# Patient Record
Sex: FEMALE | Race: WHITE | NOT HISPANIC OR LATINO | Employment: OTHER | URBAN - METROPOLITAN AREA
[De-identification: names, ages, dates, MRNs, and addresses within clinical notes are randomized per-mention and may not be internally consistent; named-entity substitution may affect disease eponyms.]

---

## 2018-08-19 ENCOUNTER — HOSPITAL ENCOUNTER (INPATIENT)
Facility: MEDICAL CENTER | Age: 68
LOS: 3 days | DRG: 375 | End: 2018-08-23
Attending: EMERGENCY MEDICINE | Admitting: HOSPITALIST
Payer: MEDICARE

## 2018-08-19 DIAGNOSIS — Z79.01 ANTICOAGULATED: ICD-10-CM

## 2018-08-19 DIAGNOSIS — K92.2 GASTROINTESTINAL HEMORRHAGE, UNSPECIFIED GASTROINTESTINAL HEMORRHAGE TYPE: ICD-10-CM

## 2018-08-19 DIAGNOSIS — Z86.711 HISTORY OF PULMONARY EMBOLISM: ICD-10-CM

## 2018-08-19 DIAGNOSIS — D64.9 LOW HEMOGLOBIN: ICD-10-CM

## 2018-08-19 PROCEDURE — 85576 BLOOD PLATELET AGGREGATION: CPT

## 2018-08-19 PROCEDURE — 36415 COLL VENOUS BLD VENIPUNCTURE: CPT

## 2018-08-19 PROCEDURE — 83690 ASSAY OF LIPASE: CPT

## 2018-08-19 PROCEDURE — 85025 COMPLETE CBC W/AUTO DIFF WBC: CPT

## 2018-08-19 PROCEDURE — 99285 EMERGENCY DEPT VISIT HI MDM: CPT

## 2018-08-19 PROCEDURE — 85384 FIBRINOGEN ACTIVITY: CPT

## 2018-08-19 PROCEDURE — 80053 COMPREHEN METABOLIC PANEL: CPT

## 2018-08-19 PROCEDURE — 85610 PROTHROMBIN TIME: CPT

## 2018-08-19 PROCEDURE — 85347 COAGULATION TIME ACTIVATED: CPT

## 2018-08-19 ASSESSMENT — LIFESTYLE VARIABLES
TOTAL SCORE: 0
TOTAL SCORE: 0
HAVE YOU EVER FELT YOU SHOULD CUT DOWN ON YOUR DRINKING: NO
EVER FELT BAD OR GUILTY ABOUT YOUR DRINKING: NO
CONSUMPTION TOTAL: INCOMPLETE
DO YOU DRINK ALCOHOL: YES
HAVE PEOPLE ANNOYED YOU BY CRITICIZING YOUR DRINKING: NO
TOTAL SCORE: 0
EVER HAD A DRINK FIRST THING IN THE MORNING TO STEADY YOUR NERVES TO GET RID OF A HANGOVER: NO

## 2018-08-19 ASSESSMENT — PAIN SCALES - GENERAL: PAINLEVEL_OUTOF10: 0

## 2018-08-20 ENCOUNTER — APPOINTMENT (OUTPATIENT)
Dept: RADIOLOGY | Facility: MEDICAL CENTER | Age: 68
DRG: 375 | End: 2018-08-20
Attending: HOSPITALIST
Payer: MEDICARE

## 2018-08-20 PROBLEM — K92.2 GIB (GASTROINTESTINAL BLEEDING): Status: ACTIVE | Noted: 2018-08-20

## 2018-08-20 PROBLEM — I26.99 PULMONARY EMBOLISM (HCC): Status: ACTIVE | Noted: 2018-08-20

## 2018-08-20 LAB
ABO GROUP BLD: NORMAL
ABO GROUP BLD: NORMAL
ALBUMIN SERPL BCP-MCNC: 3.5 G/DL (ref 3.2–4.9)
ALBUMIN/GLOB SERPL: 1.3 G/DL
ALP SERPL-CCNC: 67 U/L (ref 30–99)
ALT SERPL-CCNC: 14 U/L (ref 2–50)
ANION GAP SERPL CALC-SCNC: 8 MMOL/L (ref 0–11.9)
AST SERPL-CCNC: 15 U/L (ref 12–45)
BASOPHILS # BLD AUTO: 0.6 % (ref 0–1.8)
BASOPHILS # BLD: 0.04 K/UL (ref 0–0.12)
BILIRUB SERPL-MCNC: 0.4 MG/DL (ref 0.1–1.5)
BLD GP AB SCN SERPL QL: NORMAL
BUN SERPL-MCNC: 19 MG/DL (ref 8–22)
CALCIUM SERPL-MCNC: 9.1 MG/DL (ref 8.5–10.5)
CFT BLD TEG: 6.3 MIN (ref 5–10)
CHLORIDE SERPL-SCNC: 108 MMOL/L (ref 96–112)
CLOT ANGLE BLD TEG: 64.4 DEGREES (ref 53–72)
CLOT LYSIS 30M P MA LENFR BLD TEG: 0 % (ref 0–8)
CO2 SERPL-SCNC: 24 MMOL/L (ref 20–33)
CREAT SERPL-MCNC: 0.97 MG/DL (ref 0.5–1.4)
CT.EXTRINSIC BLD ROTEM: 1.8 MIN (ref 1–3)
EOSINOPHIL # BLD AUTO: 0.1 K/UL (ref 0–0.51)
EOSINOPHIL NFR BLD: 1.4 % (ref 0–6.9)
ERYTHROCYTE [DISTWIDTH] IN BLOOD BY AUTOMATED COUNT: 44.5 FL (ref 35.9–50)
GLOBULIN SER CALC-MCNC: 2.7 G/DL (ref 1.9–3.5)
GLUCOSE SERPL-MCNC: 107 MG/DL (ref 65–99)
HCT VFR BLD AUTO: 31.8 % (ref 37–47)
HEMOCCULT STL QL: POSITIVE
HGB BLD-MCNC: 10.2 G/DL (ref 12–16)
HGB BLD-MCNC: 10.4 G/DL (ref 12–16)
HGB BLD-MCNC: 10.6 G/DL (ref 12–16)
HGB BLD-MCNC: 10.6 G/DL (ref 12–16)
IMM GRANULOCYTES # BLD AUTO: 0.01 K/UL (ref 0–0.11)
IMM GRANULOCYTES NFR BLD AUTO: 0.1 % (ref 0–0.9)
INR PPP: 1.53 (ref 0.87–1.13)
LIPASE SERPL-CCNC: 13 U/L (ref 11–82)
LYMPHOCYTES # BLD AUTO: 2.47 K/UL (ref 1–4.8)
LYMPHOCYTES NFR BLD: 34.7 % (ref 22–41)
MCF BLD TEG: 65.9 MM (ref 50–70)
MCH RBC QN AUTO: 30.7 PG (ref 27–33)
MCHC RBC AUTO-ENTMCNC: 33.3 G/DL (ref 33.6–35)
MCV RBC AUTO: 92.2 FL (ref 81.4–97.8)
MONOCYTES # BLD AUTO: 0.48 K/UL (ref 0–0.85)
MONOCYTES NFR BLD AUTO: 6.8 % (ref 0–13.4)
NEUTROPHILS # BLD AUTO: 4.01 K/UL (ref 2–7.15)
NEUTROPHILS NFR BLD: 56.4 % (ref 44–72)
NRBC # BLD AUTO: 0 K/UL
NRBC BLD-RTO: 0 /100 WBC
PLATELET # BLD AUTO: 199 K/UL (ref 164–446)
PMV BLD AUTO: 10.3 FL (ref 9–12.9)
POTASSIUM SERPL-SCNC: 3.7 MMOL/L (ref 3.6–5.5)
PROT SERPL-MCNC: 6.2 G/DL (ref 6–8.2)
PROTHROMBIN TIME: 18.1 SEC (ref 12–14.6)
RBC # BLD AUTO: 3.45 M/UL (ref 4.2–5.4)
RH BLD: NORMAL
RH BLD: NORMAL
SODIUM SERPL-SCNC: 140 MMOL/L (ref 135–145)
TEG ALGORITHM TGALG: NORMAL
WBC # BLD AUTO: 7.1 K/UL (ref 4.8–10.8)

## 2018-08-20 PROCEDURE — 86901 BLOOD TYPING SEROLOGIC RH(D): CPT

## 2018-08-20 PROCEDURE — 99223 1ST HOSP IP/OBS HIGH 75: CPT | Mod: AI | Performed by: HOSPITALIST

## 2018-08-20 PROCEDURE — 700105 HCHG RX REV CODE 258: Performed by: HOSPITALIST

## 2018-08-20 PROCEDURE — 770020 HCHG ROOM/CARE - TELE (206)

## 2018-08-20 PROCEDURE — 78278 ACUTE GI BLOOD LOSS IMAGING: CPT

## 2018-08-20 PROCEDURE — 700111 HCHG RX REV CODE 636 W/ 250 OVERRIDE (IP): Performed by: HOSPITALIST

## 2018-08-20 PROCEDURE — 36415 COLL VENOUS BLD VENIPUNCTURE: CPT

## 2018-08-20 PROCEDURE — 86850 RBC ANTIBODY SCREEN: CPT

## 2018-08-20 PROCEDURE — 700101 HCHG RX REV CODE 250: Performed by: HOSPITALIST

## 2018-08-20 PROCEDURE — 82272 OCCULT BLD FECES 1-3 TESTS: CPT

## 2018-08-20 PROCEDURE — 85018 HEMOGLOBIN: CPT

## 2018-08-20 PROCEDURE — C9113 INJ PANTOPRAZOLE SODIUM, VIA: HCPCS | Performed by: HOSPITALIST

## 2018-08-20 PROCEDURE — 86900 BLOOD TYPING SEROLOGIC ABO: CPT

## 2018-08-20 RX ORDER — ONDANSETRON 4 MG/1
4 TABLET, ORALLY DISINTEGRATING ORAL EVERY 4 HOURS PRN
Status: DISCONTINUED | OUTPATIENT
Start: 2018-08-20 | End: 2018-08-23 | Stop reason: HOSPADM

## 2018-08-20 RX ORDER — ONDANSETRON 2 MG/ML
4 INJECTION INTRAMUSCULAR; INTRAVENOUS EVERY 4 HOURS PRN
Status: DISCONTINUED | OUTPATIENT
Start: 2018-08-20 | End: 2018-08-23 | Stop reason: HOSPADM

## 2018-08-20 RX ORDER — PANTOPRAZOLE SODIUM 40 MG/10ML
40 INJECTION, POWDER, LYOPHILIZED, FOR SOLUTION INTRAVENOUS 2 TIMES DAILY
Status: DISCONTINUED | OUTPATIENT
Start: 2018-08-20 | End: 2018-08-22

## 2018-08-20 RX ORDER — SODIUM CHLORIDE, SODIUM LACTATE, POTASSIUM CHLORIDE, CALCIUM CHLORIDE 600; 310; 30; 20 MG/100ML; MG/100ML; MG/100ML; MG/100ML
INJECTION, SOLUTION INTRAVENOUS CONTINUOUS
Status: DISCONTINUED | OUTPATIENT
Start: 2018-08-20 | End: 2018-08-23 | Stop reason: HOSPADM

## 2018-08-20 RX ADMIN — SODIUM CHLORIDE, POTASSIUM CHLORIDE, SODIUM LACTATE AND CALCIUM CHLORIDE: 600; 310; 30; 20 INJECTION, SOLUTION INTRAVENOUS at 04:14

## 2018-08-20 RX ADMIN — FOLIC ACID 1 MG: 5 INJECTION, SOLUTION INTRAMUSCULAR; INTRAVENOUS; SUBCUTANEOUS at 05:51

## 2018-08-20 RX ADMIN — PANTOPRAZOLE SODIUM 40 MG: 40 INJECTION, POWDER, LYOPHILIZED, FOR SOLUTION INTRAVENOUS at 05:51

## 2018-08-20 RX ADMIN — PANTOPRAZOLE SODIUM 40 MG: 40 INJECTION, POWDER, LYOPHILIZED, FOR SOLUTION INTRAVENOUS at 17:56

## 2018-08-20 RX ADMIN — THIAMINE HYDROCHLORIDE 100 MG: 100 INJECTION, SOLUTION INTRAMUSCULAR; INTRAVENOUS at 05:51

## 2018-08-20 ASSESSMENT — ENCOUNTER SYMPTOMS
WHEEZING: 0
DIARRHEA: 1
VOMITING: 0
PALPITATIONS: 0
HEADACHES: 0
NAUSEA: 0
DIZZINESS: 0
TINGLING: 0
DEPRESSION: 0
BLOOD IN STOOL: 1
ABDOMINAL PAIN: 0
MYALGIAS: 0
PHOTOPHOBIA: 0
CHILLS: 0
COUGH: 0
FOCAL WEAKNESS: 0
SORE THROAT: 0
FEVER: 0
SHORTNESS OF BREATH: 0

## 2018-08-20 ASSESSMENT — COGNITIVE AND FUNCTIONAL STATUS - GENERAL
SUGGESTED CMS G CODE MODIFIER DAILY ACTIVITY: CH
DAILY ACTIVITIY SCORE: 24
MOBILITY SCORE: 24
SUGGESTED CMS G CODE MODIFIER MOBILITY: CH

## 2018-08-20 ASSESSMENT — PAIN SCALES - GENERAL
PAINLEVEL_OUTOF10: 0

## 2018-08-20 ASSESSMENT — COPD QUESTIONNAIRES
IN THE PAST 12 MONTHS DO YOU DO LESS THAN YOU USED TO BECAUSE OF YOUR BREATHING PROBLEMS: DISAGREE/UNSURE
COPD SCREENING SCORE: 2
DURING THE PAST 4 WEEKS HOW MUCH DID YOU FEEL SHORT OF BREATH: NONE/LITTLE OF THE TIME
HAVE YOU SMOKED AT LEAST 100 CIGARETTES IN YOUR ENTIRE LIFE: NO/DON'T KNOW
DO YOU EVER COUGH UP ANY MUCUS OR PHLEGM?: NO/ONLY WITH OCCASIONAL COLDS OR INFECTIONS

## 2018-08-20 ASSESSMENT — PATIENT HEALTH QUESTIONNAIRE - PHQ9
2. FEELING DOWN, DEPRESSED, IRRITABLE, OR HOPELESS: NOT AT ALL
SUM OF ALL RESPONSES TO PHQ9 QUESTIONS 1 AND 2: 0
1. LITTLE INTEREST OR PLEASURE IN DOING THINGS: NOT AT ALL

## 2018-08-20 ASSESSMENT — LIFESTYLE VARIABLES
ALCOHOL_USE: YES
ON A TYPICAL DAY WHEN YOU DRINK ALCOHOL HOW MANY DRINKS DO YOU HAVE: 2
TOTAL SCORE: 0
EVER FELT BAD OR GUILTY ABOUT YOUR DRINKING: NO
TOTAL SCORE: 0
EVER_SMOKED: NEVER
EVER HAD A DRINK FIRST THING IN THE MORNING TO STEADY YOUR NERVES TO GET RID OF A HANGOVER: NO
HAVE YOU EVER FELT YOU SHOULD CUT DOWN ON YOUR DRINKING: NO
CONSUMPTION TOTAL: POSITIVE
HAVE PEOPLE ANNOYED YOU BY CRITICIZING YOUR DRINKING: NO
AVERAGE NUMBER OF DAYS PER WEEK YOU HAVE A DRINK CONTAINING ALCOHOL: 5
TOTAL SCORE: 0
HOW MANY TIMES IN THE PAST YEAR HAVE YOU HAD 5 OR MORE DRINKS IN A DAY: 0

## 2018-08-20 NOTE — ED PROVIDER NOTES
ED Provider Note  Chief Complaint:   Rectal bleed    HPI:  Shakila Perez is a 68 y.o. female who presents as a transfer from [facility] emergency department for rectal bleeding.  On July 30, 2018 she was diagnosed with a pulmonary embolism and DVT and started on Xarelto.  This DVT was unprovoked, with uncertain cause at this time.  Her last dose of Xarelto was this morning, she is scheduled to take it twice daily, but did not take her evening dose.  Earlier this afternoon she developed increased frequency of stool and began to pass dark clots.  She then developed associated abdominal discomfort described as cramping without localized pain.  Over the course of the evening her bowel movements became more frequent, and she began to pass red blood clots.  She is concerned because her symptoms seem to be worsening and presented to [facility] emergency department.  She denies any shortness of breath, she states she felt some very mild symptoms of lightheadedness without describing near syncope.    She has not had any associated nausea or vomiting, no hematemesis.  She describes no abnormal bleeding or bruising prior to this event.  She denies any other medical problems.  She has no hematuria, no fevers, no chest pain, no shortness of breath.  She has no headaches, no neck or back pain.  She has no impaired immunity, no history of hyperglycemia.    She is not currently taking any anticoagulation or antiplatelet agents.  She is visiting from out of town, does not have any local specialists nor primary care provider.    Review of Systems:  See HPI for pertinent positives and negatives. All other systems negative.    Past Medical History:       Social History:  Social History     Social History Main Topics   • Smoking status: Not on file   • Smokeless tobacco: Not on file   • Alcohol use Not on file   • Drug use: Unknown   • Sexual activity: Not on file       Surgical History:  patient denies any surgical history    Current  "Medications:  Home Medications     Reviewed by Shaan Rueda (Pharmacy Tech) on 08/20/18 at 0010  Med List Status: Complete   Medication Last Dose Status   Rivaroxaban (XARELTO STARTER PACK) 15 & 20 MG Tablet Therapy Pack 8/19/2018 Active                Allergies:  No Known Allergies    Physical Exam:  Vital Signs: /78   Pulse 66   Temp 36.8 °C (98.3 °F)   Resp 16   Ht 1.626 m (5' 4\")   Wt 81.6 kg (180 lb)   SpO2 91%   BMI 30.90 kg/m²   Constitutional: Alert, no acute distress  HENT: Moist mucus membranes, normal posterior pharynx, no intraoral lesions  Eyes: Pupils equal and reactive, normal conjunctiva, no conjunctival pallor, no scleral icterus  Neck: Supple, normal range of motion, no stridor  Cardiovascular: Extremities are warm and well perfused, no murmur appreciated, normal cardiac auscultation  Pulmonary: No respiratory distress, normal work of breathing, no accessory muscule usage, breath sounds clear and equal bilaterally  Abdomen: Soft, non-distended, non-tender to palpation, no peritoneal signs, no palpable masses  : No stool in the rectal vault, occult guaiac test is strongly positive  Skin: Warm, dry, no rashes or lesions  Musculoskeletal: Normal range of motion in all extremities, no swelling or deformity noted  Neurologic: Alert, oriented, normal speech, normal motor function  Psychiatric: Normal and appropriate mood and affect    Medical records reviewed for continuity of care. Emergency records reviewed from [facility].  On laboratory evaluation White blood count 7.3, hemoglobin 11.2, hematocrit 33.1, platelet count 207.  INR 1.7.  No significant electrolyte abnormalities.  Patient presented to the emergency department with chief complaint of rectal bleeding, reported initially dark clots, then clots that were more red colored.  Noted to have a history of pulmonary embolism, currently on Xarelto which was initiated July 30, 2018.  Rectal exam " documented as negative for blood, no occult blood test documented.    Labs:  Labs Reviewed   CBC WITH DIFFERENTIAL - Abnormal; Notable for the following:        Result Value    RBC 3.45 (*)     Hemoglobin 10.6 (*)     Hematocrit 31.8 (*)     MCHC 33.3 (*)     All other components within normal limits    Narrative:     Indicate which anticoagulants the patient is on:->UNKNOWN  Is the patient on heparin (including low molecular weight  heparin, subcutaneous heparin, or lovenox)?->No  Do you want to extend TEG graph to LY30? (If no, graph will  terminate at MA)->Yes   COMP METABOLIC PANEL - Abnormal; Notable for the following:     Glucose 107 (*)     All other components within normal limits    Narrative:     Indicate which anticoagulants the patient is on:->UNKNOWN  Is the patient on heparin (including low molecular weight  heparin, subcutaneous heparin, or lovenox)?->No  Do you want to extend TEG graph to LY30? (If no, graph will  terminate at MA)->Yes   PROTHROMBIN TIME - Abnormal; Notable for the following:     PT 18.1 (*)     INR 1.53 (*)     All other components within normal limits    Narrative:     Indicate which anticoagulants the patient is on:->UNKNOWN  Is the patient on heparin (including low molecular weight  heparin, subcutaneous heparin, or lovenox)?->No  Do you want to extend TEG graph to LY30? (If no, graph will  terminate at MA)->Yes   ESTIMATED GFR - Abnormal; Notable for the following:     GFR If Non  57 (*)     All other components within normal limits    Narrative:     Indicate which anticoagulants the patient is on:->UNKNOWN  Is the patient on heparin (including low molecular weight  heparin, subcutaneous heparin, or lovenox)?->No  Do you want to extend TEG graph to LY30? (If no, graph will  terminate at MA)->Yes   LIPASE    Narrative:     Indicate which anticoagulants the patient is on:->UNKNOWN  Is the patient on heparin (including low molecular weight  heparin, subcutaneous  heparin, or lovenox)?->No  Do you want to extend TEG graph to LY30? (If no, graph will  terminate at MA)->Yes   COD (ADULT)   BASIC TEG    Narrative:     Indicate which anticoagulants the patient is on:->UNKNOWN  Is the patient on heparin (including low molecular weight  heparin, subcutaneous heparin, or lovenox)?->No  Do you want to extend TEG graph to LY30? (If no, graph will  terminate at MA)->Yes   ABO AND RH CONFIRMATION   OCCULT BLOOD STOOL       Radiology:  No orders to display        ED Medications Administered:  Medications - No data to display    Differential diagnosis:  Lower GI bleed, malignancy, bleeding polyp, anemia    MDM:  History and physical exam as documented above.  Patient presents with symptoms of lower GI bleed.  She was recently started on Xarelto, during the course of her evaluation for pulmonary embolism she reports getting a CT of the abdomen pelvis to evaluate for a kidney stone.  No malignancy was noted. Last colonoscopy was approximately 10 years ago.  Her last dose of Xarelto was this morning.    On laboratory evaluation CMP is within normal limits.  Lipase is within normal limits.  INR is 1.53.  Hemoglobin is 10.6, down from 11.2 at [facility].  Occult blood guaiac test is positive.    She remained well-appearing throughout her stay in the emergency department.  She did not have any further episodes of bloody stool.  On my reassessment, abdominal exam remains benign.    Plan at this time is for admission the hospitalist service.  Case discussed with Dr. Hicks who kindly agrees to consult gastroenterology in the morning, as this is the request of consulting service.    Disposition:  Admit to hospitalist in guarded condition    Final Impression:  1. Gastrointestinal hemorrhage, unspecified gastrointestinal hemorrhage type    2. Low hemoglobin    3. History of pulmonary embolism    4. Anticoagulated        Electronically signed by: Maricel Sandoval, 8/20/2018 2:13 AM

## 2018-08-20 NOTE — PROGRESS NOTES
Just admitted this am for GIB, holding anticoagulation, GI consulted, monitor heart rate and vitals for decompensation.

## 2018-08-20 NOTE — ED NOTES
Blood drawn and sent to lab. Pt ambulated to bathroom with stand-by assist, gait steady to provide stool sample.

## 2018-08-20 NOTE — CARE PLAN
Problem: Communication  Goal: The ability to communicate needs accurately and effectively will improve  Outcome: PROGRESSING AS EXPECTED    Intervention: [place] patient and significant other/support system to call light to alert staff of needs   08/20/18 0434   OTHER   Oriented to: All of the Following : Location of Bathroom, Visiting Policy, Unit Routine, Call Light and Bedside Controls, Bedside Rail Policy, Smoking Policy, Rights and Responsibilities, Bedside Report, and Patient Education Notebook         Problem: Knowledge Deficit  Goal: Knowledge of disease process/condition, treatment plan, diagnostic tests, and medications will improve  Outcome: PROGRESSING AS EXPECTED  Patient educated about disease process and plan of care for the shift. Patient expressed understanding. All questions answered at this time.

## 2018-08-20 NOTE — ED NOTES
Blood drawn and sent to lab. Pts bed adjusted and lights dimmed for comfort. Call light within reach.

## 2018-08-20 NOTE — PROGRESS NOTES
Bedside report received. Patient up to floor. Monitor room called, patient in sinus rhythm at a rate of 68. Patient encouraged to call before getting up, call light within reach. Bed locked and in lowest position.

## 2018-08-20 NOTE — ED TRIAGE NOTES
"Chief Complaint   Patient presents with   • Rectal Bleeding     Pt reports loose dark clots in stool that started at 1100, but states blood is now bright red.      /78   Pulse 67   Temp 36.8 °C (98.3 °F)   Resp 18   Ht 1.626 m (5' 4\")   Wt 81.6 kg (180 lb)   SpO2 92%   BMI 30.90 kg/m²     Pt brought in by EMS, transfer from [facility], pt presents with rectal bleeding that started this am. Recent DVT and PE diagnosis, started on Xarelto. pt states \"rumbling stomach\" other than that denies dizziness, SOB. Placed in room GR 24. Complaints and vitals as above. Pt on monitors, all alarms audible. Chart flagged for ERP to see.  "

## 2018-08-20 NOTE — PROGRESS NOTES
Two RN skin assessment completed with GUERLINE Diana.  Sacrum red and blanching.   Bilateral heels dry, red, and blanching.   Skin otherwise intact.

## 2018-08-20 NOTE — H&P
Hospital Medicine History and Physical    Date of Service  8/20/2018    Chief Complaint  Chief Complaint   Patient presents with   • Rectal Bleeding     Pt reports loose dark clots in stool that started at 1100, but states blood is now bright red.        History of Presenting Illness  68 y.o. female who presented on 8/19/2018 in transfer from outside facility for GI bleed.  The patient was initially seen at [facility] with complaints of bloody stools.  The patient's recent history is significant for diagnosis of DVT/PE on July 30 and initiation of anticoagulation therapy with Xarelto.  She states that around 11:00 this morning, she had a loose bowel movement which was dark and subsequently became associated with large clots of blood.  She had associated abdominal cramps.  She reports having a bowel movement every hour and eventually, she began to have bright red blood per rectum.  She denies any NSAID use and decided to hold her nighttime dose of Xarelto although she did take her morning dose.  She denies any alcohol abuse, she reports drinking 1-2 beers with dinner per night.  She has a history of a previous episode of tarry stools 3 months ago which resolved spontaneously.  She has not had a colonoscopy in approximately 10 years.  She denies any recent hiking or picnics but did ride the [employer] [place] yesterday.  She denies that anyone else in her family is sick with similar symptoms.  She has had no hematemesis or hemoptysis.  She was transferred to our facility for higher level of care and potential specialist consultation.        Primary Care Physician  No primary care provider on file.    Consultants  None    Code Status  Full    Review of Systems  Review of Systems   Constitutional: Negative for chills and fever.   HENT: Negative for congestion and sore throat.    Eyes: Negative for photophobia.   Respiratory: Negative for cough, shortness of breath and wheezing.    Cardiovascular: Negative for  chest pain and palpitations.   Gastrointestinal: Positive for blood in stool, diarrhea and melena. Negative for abdominal pain, nausea and vomiting.   Genitourinary: Negative for dysuria.   Musculoskeletal: Negative for myalgias.   Skin: Negative.    Neurological: Negative for dizziness, tingling, focal weakness and headaches.   Psychiatric/Behavioral: Negative for depression and suicidal ideas.        Past Medical History  PE/DVT    Surgical History  Appendectomy    Medications  No current facility-administered medications on file prior to encounter.      No current outpatient prescriptions on file prior to encounter.       Family History  Father with colonic polyps but no history of colon cancer    Social History  No tobacco, illicit drugs, patient drinks 1-2 beers with dinner    Allergies  No Known Allergies     Physical Exam  Laboratory   Hemodynamics  Temp (24hrs), Av.8 °C (98.3 °F), Min:36.8 °C (98.3 °F), Max:36.8 °C (98.3 °F)   Temperature: 36.8 °C (98.3 °F)  Pulse  Av.6  Min: 59  Max: 71 Heart Rate (Monitored): 60  Blood Pressure : 119/78, NIBP: 138/71      Respiratory      Respiration: 16, Pulse Oximetry: 91 %             Physical Exam   Constitutional: She is oriented to person, place, and time. No distress.   HENT:   Head: Normocephalic and atraumatic.   Right Ear: External ear normal.   Left Ear: External ear normal.   Eyes: EOM are normal. Right eye exhibits no discharge. Left eye exhibits no discharge.   Neck: Neck supple. No JVD present.   Cardiovascular: Normal rate, regular rhythm and normal heart sounds.    Pulmonary/Chest: Effort normal and breath sounds normal. No respiratory distress. She exhibits no tenderness.   Abdominal: Soft. Bowel sounds are normal. She exhibits no distension. There is no tenderness.   Musculoskeletal: Normal range of motion. She exhibits no edema.   Neurological: She is alert and oriented to person, place, and time. No cranial nerve deficit.   Skin: Skin is warm  and dry. She is not diaphoretic. No erythema.   Psychiatric: She has a normal mood and affect. Her behavior is normal.   Nursing note and vitals reviewed.    Capillary refill less than 3 seconds, distal pulses intact    Recent Labs      08/19/18   2344   WBC  7.1   RBC  3.45*   HEMOGLOBIN  10.6*   HEMATOCRIT  31.8*   MCV  92.2   MCH  30.7   MCHC  33.3*   RDW  44.5   PLATELETCT  199   MPV  10.3     Recent Labs      08/19/18   2344   SODIUM  140   POTASSIUM  3.7   CHLORIDE  108   CO2  24   GLUCOSE  107*   BUN  19   CREATININE  0.97   CALCIUM  9.1     Recent Labs      08/19/18   2344   ALTSGPT  14   ASTSGOT  15   ALKPHOSPHAT  67   TBILIRUBIN  0.4   LIPASE  13   GLUCOSE  107*     Recent Labs      08/19/18   2344   INR  1.53*             No results found for: TROPONINI    Imaging  No results found.   Assessment/Plan     Anticipate that patient will need greater than 2 midnights for management of the discussed medical issues.    * GIB (gastrointestinal bleeding)   Assessment & Plan    Patient's hemoglobin level at the outside hospital was 11.2, since arrival here, it has dropped to 10.6.  Fortunately, she has not had any further bowel movements in our facility.  Occult stool is pending.  I am holding her home Xarelto and I will keep her n.p.o. for bowel rest.  It is unclear at this point if this is an upper or lower GI bleed as she has had mixed symptoms including dark stools, melena, and right red blood.  She will be monitored closely on telemetry, receive IV fluids for volume expansion, and I will trend hemoglobin levels with plans to transfuse if she drops below 7.  I have ordered a nuclear medicine bleeding scan.  Pending these results, we will consider a GI consultation.  Should her hemoglobin dropped precipitously or she has exhibits evidence of active bleeding, then will plan to discuss with overnight GI consultant.        Pulmonary embolism (HCC)   Assessment & Plan    Holding home Xarelto, treatment as noted  above for GI bleed.          Prophylaxis: Sequential compression devices for DVT prophylaxis, PPI indicated, bowel protocol as needed

## 2018-08-20 NOTE — ASSESSMENT & PLAN NOTE
Patient has a colonic mass which is likely causing thromboembolic state.  Patient will require anticoagulation.  I spoke with gastroenterology who recommends to continue anticoagulation unless there is evidence of more bleeding.  Patient is hemodynamically stable and normal drop in hemoglobin and hematocrit.

## 2018-08-20 NOTE — PROGRESS NOTES
Assumed care of patient, A+Ox4 no c/o pain at this time. Lungs clear on RA. Ambulates self w/o difficulty tolerates well. Skin intact. Sinus rhythm on telemetry monitor.

## 2018-08-20 NOTE — CARE PLAN
Problem: Knowledge Deficit  Goal: Knowledge of disease process/condition, treatment plan, diagnostic tests, and medications will improve    Intervention: Assess knowledge level of disease process/condition, treatment plan, diagnostic tests, and medications  Answer questions regarding GI tests and Blood counts in relation to GI bleed.       Problem: Discharge Barriers/Planning  Goal: Patient's continuum of care needs will be met    Intervention: Assess potential discharge barriers on admission and throughout hospital stay  Monitor labs, monitor stool for s/s of bleeding, discuss requirements that must be met and tests completed before discharge.

## 2018-08-21 LAB
ANION GAP SERPL CALC-SCNC: 5 MMOL/L (ref 0–11.9)
BUN SERPL-MCNC: 13 MG/DL (ref 8–22)
CALCIUM SERPL-MCNC: 8.6 MG/DL (ref 8.5–10.5)
CHLORIDE SERPL-SCNC: 111 MMOL/L (ref 96–112)
CO2 SERPL-SCNC: 26 MMOL/L (ref 20–33)
CREAT SERPL-MCNC: 0.84 MG/DL (ref 0.5–1.4)
GLUCOSE SERPL-MCNC: 97 MG/DL (ref 65–99)
HGB BLD-MCNC: 10.1 G/DL (ref 12–16)
HGB BLD-MCNC: 10.7 G/DL (ref 12–16)
HGB BLD-MCNC: 11.5 G/DL (ref 12–16)
POTASSIUM SERPL-SCNC: 3.7 MMOL/L (ref 3.6–5.5)
SODIUM SERPL-SCNC: 142 MMOL/L (ref 135–145)

## 2018-08-21 PROCEDURE — 700105 HCHG RX REV CODE 258: Performed by: FAMILY MEDICINE

## 2018-08-21 PROCEDURE — 700101 HCHG RX REV CODE 250: Performed by: INTERNAL MEDICINE

## 2018-08-21 PROCEDURE — 700111 HCHG RX REV CODE 636 W/ 250 OVERRIDE (IP): Performed by: HOSPITALIST

## 2018-08-21 PROCEDURE — 700105 HCHG RX REV CODE 258: Performed by: HOSPITALIST

## 2018-08-21 PROCEDURE — 85018 HEMOGLOBIN: CPT

## 2018-08-21 PROCEDURE — 770020 HCHG ROOM/CARE - TELE (206)

## 2018-08-21 PROCEDURE — 80048 BASIC METABOLIC PNL TOTAL CA: CPT

## 2018-08-21 PROCEDURE — 36415 COLL VENOUS BLD VENIPUNCTURE: CPT

## 2018-08-21 PROCEDURE — C9113 INJ PANTOPRAZOLE SODIUM, VIA: HCPCS | Performed by: HOSPITALIST

## 2018-08-21 PROCEDURE — 700101 HCHG RX REV CODE 250: Performed by: HOSPITALIST

## 2018-08-21 RX ADMIN — PANTOPRAZOLE SODIUM 40 MG: 40 INJECTION, POWDER, LYOPHILIZED, FOR SOLUTION INTRAVENOUS at 05:55

## 2018-08-21 RX ADMIN — POLYETHYLENE GLYCOL 3350, SODIUM SULFATE ANHYDROUS, SODIUM BICARBONATE, SODIUM CHLORIDE, POTASSIUM CHLORIDE 4 L: 236; 22.74; 6.74; 5.86; 2.97 POWDER, FOR SOLUTION ORAL at 18:17

## 2018-08-21 RX ADMIN — SODIUM CHLORIDE, POTASSIUM CHLORIDE, SODIUM LACTATE AND CALCIUM CHLORIDE: 600; 310; 30; 20 INJECTION, SOLUTION INTRAVENOUS at 17:36

## 2018-08-21 RX ADMIN — FOLIC ACID 1 MG: 5 INJECTION, SOLUTION INTRAMUSCULAR; INTRAVENOUS; SUBCUTANEOUS at 06:01

## 2018-08-21 RX ADMIN — SODIUM CHLORIDE, POTASSIUM CHLORIDE, SODIUM LACTATE AND CALCIUM CHLORIDE: 600; 310; 30; 20 INJECTION, SOLUTION INTRAVENOUS at 04:32

## 2018-08-21 RX ADMIN — PANTOPRAZOLE SODIUM 40 MG: 40 INJECTION, POWDER, LYOPHILIZED, FOR SOLUTION INTRAVENOUS at 18:20

## 2018-08-21 RX ADMIN — THIAMINE HYDROCHLORIDE 100 MG: 100 INJECTION, SOLUTION INTRAMUSCULAR; INTRAVENOUS at 07:00

## 2018-08-21 ASSESSMENT — ENCOUNTER SYMPTOMS
FEVER: 0
HEARTBURN: 0
BLURRED VISION: 0
ORTHOPNEA: 0
WEIGHT LOSS: 0
CHILLS: 0
DOUBLE VISION: 0
PALPITATIONS: 0
BLOOD IN STOOL: 1
NAUSEA: 0

## 2018-08-21 ASSESSMENT — PAIN SCALES - GENERAL
PAINLEVEL_OUTOF10: 0

## 2018-08-21 NOTE — CARE PLAN
Problem: Communication  Goal: The ability to communicate needs accurately and effectively will improve  Outcome: PROGRESSING AS EXPECTED  Plan of care discussed with pt. Pt understands and agrees to plan of care. Pt instructed to ask questions, as needed. Pt verbalized understanding.     Problem: Safety  Goal: Will remain free from injury  Outcome: PROGRESSING AS EXPECTED  Pt remains free from falls or injuries. Pt up self and calls for assistance, as needed. Pt steady on feet. Treaded socks in place.

## 2018-08-21 NOTE — CONSULTS
DATE OF SERVICE:  08/21/2018    REASON FOR CONSULTATION:  Acute GI blood loss anemia, hematochezia,   anticoagulant use, and question of melena.    REFERRING PHYSICIAN:  Sharee Hewitt MD    Thank you very much for asking me to see this delightful 68-year-old woman   from out of town.  She is currently here in town visiting.  She has a history   of DVT and on 07/30 of this year.  She was placed on Xarelto for this.  She   was in her usual state of health and carrying out her normal activity level   until the day prior to presentation when she developed abdominal fecal urge   followed by dark tarry stool.  This increased in frequency and severity to the   point that it was becoming bright red bloody stool with bright red clots   every hour or so.  It was associated with very mild abdominal urgency and mild   cramping and borborygmi.  She has not had significant bowel movement since   admit.  She reports that she had a similar episode 3 months ago, which lasted   only 1 day and was only dark stool and then resolved.  She denies fevers,   chills or sweats.  No chest pain or shortness of breath.  No nausea or   vomiting, no hematemesis or coffee-ground emesis.  She otherwise feels well.    Her last colonoscopy was over 10 years ago at an outside hospital and she   reports was normal.  She does have a daughter who has colon polyps at a   relatively young age.    REVIEW OF SYSTEMS:  A 10-system review of systems performed by myself,   pertinent positives and negatives stated otherwise noncontributory.    PAST MEDICAL HISTORY:  DVT and PE.    PAST SURGICAL HISTORY:  Appendectomy.  Colonoscopy 10 years ago.    ADVERSE DRUG REACTIONS:  No known drug allergies.    MEDICATIONS:  Reviewed by myself.  Please see the chart for detail.  Of note,   she is on Xarelto, which is being held.    SOCIAL HISTORY:  Moderate alcohol use, but not to excess.  No tobacco use.    FAMILY HISTORY:  Colon polyps in her daughter, otherwise  negative for   gastrointestinal, hepatobiliary, or pancreatic disease.    LABORATORY DATA:  White count 7.1, hemoglobin 11.5, hematocrit 31.8, and   platelets 199.  Sodium 142, potassium 3.7, chloride 111, bicarbonate 26, BUN   13, creatinine 0.84, glucose 97, AST 15, ALT 14, alkaline phosphatase 67,   total bilirubin 0.4, INR 1.53 on admit.  Fecal occult blood positive.  Tagged   red blood cell nuclear medicine scan was negative.    PHYSICAL EXAMINATION:  VITAL SIGNS:  Temperature 97.6, pulse of 72, and blood pressure 114/55.  GENERAL:  She is in no acute distress, alert, oriented x3.  HEENT:  Sclerae nonicteric.  Mucous membranes are pink and moist.  No head and   neck adenopathy.  LUNGS:  Clear bilaterally.  HEART:  Sounds regular.  ABDOMEN:  Soft, nontender, and nondistended.  Bowel sounds are normal.    ASSESSMENT:  1.  Acute gastrointestinal bleed anemia.  2.  Melena/hematochezia.  3.  Anticoagulant use.  4.  DVT and PE.    DISCUSSION:  From her history, this sounds like it could be an upper   gastrointestinal bleed with initial dark material; however, she is ____   unstable.  She looks very well and has had no significant abdominal pain or   cramping.  I favor a lower GI bleed in the form of likely diverticulosis in   nature.  Other entities such as ischemic colitis, neoplasm, arteriovenous   malformations are also on the list.  There is no indication of portal   hypertension, so varices are less likely.    PLAN:  1.  Clear liquid diet okay, no red food.  2.  N.p.o. past midnight.  3.  Continue to hold Xarelto.  4.  Serial CBCs.  5.  Transfuse to keep hemoglobin greater than or equal to 7.  6.  Start colonoscopy prep.  7.  EGD with colonoscopy in the department with moderate sedation tomorrow.    Thank you very much for allowing me to participate in the care of this very   nice lady.  If you have any questions, please do not hesitate to call.       ____________________________________     YFN ANGELES  MD KACI ISSA / AMOS    DD:  08/21/2018 16:29:59  DT:  08/21/2018 16:56:29    D#:  [id number]  Job#:  [id number]

## 2018-08-21 NOTE — PROGRESS NOTES
Pt care assumed. Pt lying comfortably in bed. A&O x 4. No distress present; no pain. Call light, phone, and bedside table within reach. White board updated and plan of care discussed with patient. Pt up self and calls for assistance, as needed. Will continue to monitor.

## 2018-08-21 NOTE — PROGRESS NOTES
Assumed care of patient, A+Ox4 no c/o pain at this time. Ambulates self to bathroom tolerates well. Sinus rhythm on telemetry monitor. Lungs clear on RA. Skin intact.

## 2018-08-22 ENCOUNTER — PATIENT OUTREACH (OUTPATIENT)
Dept: HEALTH INFORMATION MANAGEMENT | Facility: OTHER | Age: 68
End: 2018-08-22

## 2018-08-22 ENCOUNTER — APPOINTMENT (OUTPATIENT)
Dept: RADIOLOGY | Facility: MEDICAL CENTER | Age: 68
DRG: 375 | End: 2018-08-22
Attending: INTERNAL MEDICINE
Payer: MEDICARE

## 2018-08-22 PROBLEM — K63.89 MASS OF COLON: Chronic | Status: ACTIVE | Noted: 2018-08-22

## 2018-08-22 PROBLEM — K92.2 GIB (GASTROINTESTINAL BLEEDING): Status: RESOLVED | Noted: 2018-08-20 | Resolved: 2018-08-22

## 2018-08-22 PROBLEM — K22.10 EROSIVE ESOPHAGITIS: Chronic | Status: ACTIVE | Noted: 2018-08-22

## 2018-08-22 PROBLEM — K63.89 MASS OF COLON: Chronic | Status: RESOLVED | Noted: 2018-08-22 | Resolved: 2018-08-22

## 2018-08-22 LAB — CEA SERPL-MCNC: 1.6 NG/ML (ref 0–3)

## 2018-08-22 PROCEDURE — 700117 HCHG RX CONTRAST REV CODE 255: Performed by: INTERNAL MEDICINE

## 2018-08-22 PROCEDURE — 71260 CT THORAX DX C+: CPT

## 2018-08-22 PROCEDURE — 88312 SPECIAL STAINS GROUP 1: CPT

## 2018-08-22 PROCEDURE — 700111 HCHG RX REV CODE 636 W/ 250 OVERRIDE (IP)

## 2018-08-22 PROCEDURE — 160208 HCHG ENDO MINUTES - EA ADDL 1 MIN LEVEL 4: Performed by: INTERNAL MEDICINE

## 2018-08-22 PROCEDURE — 700102 HCHG RX REV CODE 250 W/ 637 OVERRIDE(OP): Performed by: FAMILY MEDICINE

## 2018-08-22 PROCEDURE — C9113 INJ PANTOPRAZOLE SODIUM, VIA: HCPCS | Performed by: HOSPITALIST

## 2018-08-22 PROCEDURE — 700101 HCHG RX REV CODE 250: Performed by: HOSPITALIST

## 2018-08-22 PROCEDURE — 99153 MOD SED SAME PHYS/QHP EA: CPT | Performed by: INTERNAL MEDICINE

## 2018-08-22 PROCEDURE — 0DBN8ZZ EXCISION OF SIGMOID COLON, VIA NATURAL OR ARTIFICIAL OPENING ENDOSCOPIC: ICD-10-PCS | Performed by: INTERNAL MEDICINE

## 2018-08-22 PROCEDURE — 700105 HCHG RX REV CODE 258: Performed by: FAMILY MEDICINE

## 2018-08-22 PROCEDURE — 700105 HCHG RX REV CODE 258: Performed by: INTERNAL MEDICINE

## 2018-08-22 PROCEDURE — 0DB38ZX EXCISION OF LOWER ESOPHAGUS, VIA NATURAL OR ARTIFICIAL OPENING ENDOSCOPIC, DIAGNOSTIC: ICD-10-PCS | Performed by: INTERNAL MEDICINE

## 2018-08-22 PROCEDURE — 500066 HCHG BITE BLOCK, ECT: Performed by: INTERNAL MEDICINE

## 2018-08-22 PROCEDURE — 0DB78ZX EXCISION OF STOMACH, PYLORUS, VIA NATURAL OR ARTIFICIAL OPENING ENDOSCOPIC, DIAGNOSTIC: ICD-10-PCS | Performed by: INTERNAL MEDICINE

## 2018-08-22 PROCEDURE — 770020 HCHG ROOM/CARE - TELE (206)

## 2018-08-22 PROCEDURE — 0DBN8ZX EXCISION OF SIGMOID COLON, VIA NATURAL OR ARTIFICIAL OPENING ENDOSCOPIC, DIAGNOSTIC: ICD-10-PCS | Performed by: INTERNAL MEDICINE

## 2018-08-22 PROCEDURE — 160002 HCHG RECOVERY MINUTES (STAT): Performed by: INTERNAL MEDICINE

## 2018-08-22 PROCEDURE — 82378 CARCINOEMBRYONIC ANTIGEN: CPT

## 2018-08-22 PROCEDURE — 0DBM8ZX EXCISION OF DESCENDING COLON, VIA NATURAL OR ARTIFICIAL OPENING ENDOSCOPIC, DIAGNOSTIC: ICD-10-PCS | Performed by: INTERNAL MEDICINE

## 2018-08-22 PROCEDURE — 160048 HCHG OR STATISTICAL LEVEL 1-5: Performed by: INTERNAL MEDICINE

## 2018-08-22 PROCEDURE — 503081 HCHG INK, SPOT LF (ENDO): Performed by: INTERNAL MEDICINE

## 2018-08-22 PROCEDURE — 99152 MOD SED SAME PHYS/QHP 5/>YRS: CPT | Performed by: INTERNAL MEDICINE

## 2018-08-22 PROCEDURE — 501629 HCHG TUBE, LUKI TRAP STERILE DISP: Performed by: INTERNAL MEDICINE

## 2018-08-22 PROCEDURE — A9270 NON-COVERED ITEM OR SERVICE: HCPCS | Performed by: FAMILY MEDICINE

## 2018-08-22 PROCEDURE — 160203 HCHG ENDO MINUTES - 1ST 30 MINS LEVEL 4: Performed by: INTERNAL MEDICINE

## 2018-08-22 PROCEDURE — 160035 HCHG PACU - 1ST 60 MINS PHASE I: Performed by: INTERNAL MEDICINE

## 2018-08-22 PROCEDURE — 88305 TISSUE EXAM BY PATHOLOGIST: CPT | Mod: 59

## 2018-08-22 PROCEDURE — 36415 COLL VENOUS BLD VENIPUNCTURE: CPT

## 2018-08-22 PROCEDURE — 700111 HCHG RX REV CODE 636 W/ 250 OVERRIDE (IP): Performed by: HOSPITALIST

## 2018-08-22 PROCEDURE — 700105 HCHG RX REV CODE 258: Performed by: HOSPITALIST

## 2018-08-22 RX ORDER — SODIUM CHLORIDE 9 MG/ML
500 INJECTION, SOLUTION INTRAVENOUS
Status: DISPENSED | OUTPATIENT
Start: 2018-08-22 | End: 2018-08-22

## 2018-08-22 RX ORDER — MIDAZOLAM HYDROCHLORIDE 1 MG/ML
.5-2 INJECTION INTRAMUSCULAR; INTRAVENOUS PRN
Status: ACTIVE | OUTPATIENT
Start: 2018-08-22 | End: 2018-08-22

## 2018-08-22 RX ORDER — OMEPRAZOLE 20 MG/1
20 CAPSULE, DELAYED RELEASE ORAL DAILY
Status: DISCONTINUED | OUTPATIENT
Start: 2018-08-22 | End: 2018-08-23 | Stop reason: HOSPADM

## 2018-08-22 RX ORDER — MIDAZOLAM HYDROCHLORIDE 1 MG/ML
INJECTION INTRAMUSCULAR; INTRAVENOUS
Status: DISCONTINUED | OUTPATIENT
Start: 2018-08-22 | End: 2018-08-22 | Stop reason: HOSPADM

## 2018-08-22 RX ADMIN — PANTOPRAZOLE SODIUM 40 MG: 40 INJECTION, POWDER, LYOPHILIZED, FOR SOLUTION INTRAVENOUS at 05:42

## 2018-08-22 RX ADMIN — SODIUM CHLORIDE, POTASSIUM CHLORIDE, SODIUM LACTATE AND CALCIUM CHLORIDE: 600; 310; 30; 20 INJECTION, SOLUTION INTRAVENOUS at 07:44

## 2018-08-22 RX ADMIN — IOHEXOL 100 ML: 350 INJECTION, SOLUTION INTRAVENOUS at 16:19

## 2018-08-22 RX ADMIN — IOHEXOL 50 ML: 240 INJECTION, SOLUTION INTRATHECAL; INTRAVASCULAR; INTRAVENOUS; ORAL at 16:20

## 2018-08-22 RX ADMIN — THIAMINE HYDROCHLORIDE 100 MG: 100 INJECTION, SOLUTION INTRAMUSCULAR; INTRAVENOUS at 05:44

## 2018-08-22 RX ADMIN — OMEPRAZOLE 20 MG: 20 CAPSULE, DELAYED RELEASE ORAL at 15:21

## 2018-08-22 RX ADMIN — FOLIC ACID 1 MG: 5 INJECTION, SOLUTION INTRAMUSCULAR; INTRAVENOUS; SUBCUTANEOUS at 05:45

## 2018-08-22 ASSESSMENT — ENCOUNTER SYMPTOMS
BLURRED VISION: 0
FEVER: 0
PALPITATIONS: 0
NAUSEA: 0
CHILLS: 0
BLOOD IN STOOL: 0
ORTHOPNEA: 0
HEARTBURN: 0
WEIGHT LOSS: 0
DOUBLE VISION: 0

## 2018-08-22 ASSESSMENT — PAIN SCALES - GENERAL
PAINLEVEL_OUTOF10: 0

## 2018-08-22 NOTE — PROGRESS NOTES
[facility] Hospitalist Progress Note    Date of Service: 2018    Chief Complaint  68 y.o. female admitted 2018 with red/dark stools.    Interval Problem Update  2 bloody bowel movements, denies abdominal pain, nausea, vomiting    Consultants/Specialty  GI    Disposition  Home         Review of Systems   Constitutional: Negative for chills, fever and weight loss.   HENT: Negative for ear pain, hearing loss and tinnitus.    Eyes: Negative for blurred vision and double vision.   Cardiovascular: Negative for chest pain, palpitations and orthopnea.   Gastrointestinal: Positive for blood in stool. Negative for heartburn and nausea.   Genitourinary: Negative for dysuria, frequency, hematuria and urgency.   Skin: Negative for rash.      Physical Exam  Laboratory/Imaging   Hemodynamics  Temp (24hrs), Av.3 °C (97.3 °F), Min:35.9 °C (96.7 °F), Max:36.5 °C (97.7 °F)   Temperature: 36.4 °C (97.5 °F)  Pulse  Av.2  Min: 59  Max: 96    Blood Pressure : 114/63      Respiratory      Respiration: 15, Pulse Oximetry: 96 %        RUL Breath Sounds: Clear, RML Breath Sounds: Clear, RLL Breath Sounds: Clear, SRINIVAS Breath Sounds: Clear, LLL Breath Sounds: Clear    Fluids    Intake/Output Summary (Last 24 hours) at 18  Last data filed at 18 1800   Gross per 24 hour   Intake             1150 ml   Output                0 ml   Net             1150 ml       Nutrition  Orders Placed This Encounter   Procedures   • Diet Order Clear Liquids - No Red Foods     Standing Status:   Standing     Number of Occurrences:   1     Order Specific Question:   Diet:     Answer:   Clear Liquids - No Red Foods [12]     Physical Exam   Constitutional: She is oriented to person, place, and time. She appears well-developed and well-nourished.   HENT:   Head: Normocephalic and atraumatic.   Eyes: Pupils are equal, round, and reactive to light. Conjunctivae and EOM are normal.   Neck: Normal range of motion. Neck supple.    Cardiovascular: Normal rate, regular rhythm, normal heart sounds and intact distal pulses.  Exam reveals no gallop and no friction rub.    No murmur heard.  Pulmonary/Chest: Effort normal and breath sounds normal. No respiratory distress. She has no wheezes. She has no rales.   Abdominal: Soft. Bowel sounds are normal. She exhibits no distension and no mass. There is no tenderness. There is no rebound and no guarding.   Musculoskeletal: Normal range of motion. She exhibits no edema or deformity.   Neurological: She is alert and oriented to person, place, and time. She has normal reflexes.   Skin: No rash noted. No erythema.   Psychiatric: She has a normal mood and affect. Her behavior is normal.       Recent Labs      08/19/18   2344   08/21/18   0225  08/21/18   1047  08/21/18   1843   WBC  7.1   --    --    --    --    RBC  3.45*   --    --    --    --    HEMOGLOBIN  10.6*   < >  10.1*  11.5*  10.7*   HEMATOCRIT  31.8*   --    --    --    --    MCV  92.2   --    --    --    --    MCH  30.7   --    --    --    --    MCHC  33.3*   --    --    --    --    RDW  44.5   --    --    --    --    PLATELETCT  199   --    --    --    --    MPV  10.3   --    --    --    --     < > = values in this interval not displayed.     Recent Labs      08/19/18 2344 08/21/18 0225   SODIUM  140  142   POTASSIUM  3.7  3.7   CHLORIDE  108  111   CO2  24  26   GLUCOSE  107*  97   BUN  19  13   CREATININE  0.97  0.84   CALCIUM  9.1  8.6     Recent Labs      08/19/18 2344   INR  1.53*                  Assessment/Plan     * GIB (gastrointestinal bleeding)   Assessment & Plan    Discussed with gastroenterologist.  Will proceed with EGD and colonoscopy in a.m. to evaluate whether patient can tolerate anticoagulation.  Follow-up GI recommendation.        Pulmonary embolism (HCC)   Assessment & Plan    Will need to determine that whether patient can tolerate anticoagulation or not.          Quality-Core Measures   Reviewed items::  EKG  reviewed  Talley catheter::  No Talley  DVT prophylaxis pharmacological::  Contraindicated - Anemia requiring blood transfusion  DVT prophylaxis - mechanical:  SCDs

## 2018-08-22 NOTE — PROGRESS NOTES
Bedside report received from night shift RN. Assumed care. Pt is A&O x 4. Pt denies pain at this time. Pt was updated on plan of care. Pt has call light, personal belongings, and bedside table within reach. Bed is in the lowest position and bed alarm is on. Will continue to monitor.

## 2018-08-22 NOTE — ASSESSMENT & PLAN NOTE
Colon mass colonoscopy and EGD findings are reviewed.  Concerning for malignancy.  Obtain CT chest abdomen and pelvis with IV contrast to evaluate metastases.  Patient will need outpatient surgery consult and outpatient oncology consult.  I left a message for Dr. Alex Martinez at [facility] who his primary care physician per patient.  I spoke with Dr. Martinez's assistant at his office and requested to call me back with the phone numbers.  I provided my phone number.  Plan is to discharge likely tomorrow.

## 2018-08-22 NOTE — PROGRESS NOTES
[facility] Hospitalist Progress Note    Date of Service: 2018    Chief Complaint  68 y.o. female admitted 2018 with red/dark stools.    Interval Problem Update  Status post colonoscopy and EGD.  No acute complaints.  No further GI bleed.    Consultants/Specialty  Gastroenterology    Disposition  Home         Review of Systems   Constitutional: Negative for chills, fever and weight loss.   HENT: Negative for ear pain, hearing loss and tinnitus.    Eyes: Negative for blurred vision and double vision.   Cardiovascular: Negative for chest pain, palpitations and orthopnea.   Gastrointestinal: Negative for blood in stool, heartburn and nausea.   Genitourinary: Negative for dysuria, frequency, hematuria and urgency.   Skin: Negative for rash.      Physical Exam  Laboratory/Imaging   Hemodynamics  Temp (24hrs), Av.4 °C (97.6 °F), Min:36.2 °C (97.1 °F), Max:37.1 °C (98.8 °F)   Temperature: 37.1 °C (98.8 °F)  Pulse  Av.4  Min: 57  Max: 96 Heart Rate (Monitored): (!) 56  Blood Pressure : 142/72, NIBP: 101/49      Respiratory      Respiration: 18, Pulse Oximetry: 96 %        RUL Breath Sounds: Clear, RML Breath Sounds: Clear, RLL Breath Sounds: Clear, SRINIVAS Breath Sounds: Clear, LLL Breath Sounds: Clear    Fluids    Intake/Output Summary (Last 24 hours) at 18 1353  Last data filed at 18 1800   Gross per 24 hour   Intake              500 ml   Output                0 ml   Net              500 ml       Nutrition  Orders Placed This Encounter   Procedures   • Diet Order Regular     Standing Status:   Standing     Number of Occurrences:   1     Order Specific Question:   Diet:     Answer:   Regular [1]     Physical Exam   Constitutional: She is oriented to person, place, and time. She appears well-developed and well-nourished.   HENT:   Head: Normocephalic and atraumatic.   Eyes: Pupils are equal, round, and reactive to light. Conjunctivae and EOM are normal.   Neck: Normal range of motion. Neck supple.    Cardiovascular: Normal rate, regular rhythm, normal heart sounds and intact distal pulses.  Exam reveals no gallop and no friction rub.    No murmur heard.  Pulmonary/Chest: Effort normal and breath sounds normal. No respiratory distress. She has no wheezes. She has no rales.   Abdominal: Soft. Bowel sounds are normal. She exhibits no distension and no mass. There is no tenderness. There is no rebound and no guarding.   Musculoskeletal: Normal range of motion. She exhibits no edema or deformity.   Neurological: She is alert and oriented to person, place, and time. She has normal reflexes.   Skin: No rash noted. No erythema.   Psychiatric: She has a normal mood and affect. Her behavior is normal.       Recent Labs      08/19/18   2344   08/21/18   0225  08/21/18   1047  08/21/18   1843   WBC  7.1   --    --    --    --    RBC  3.45*   --    --    --    --    HEMOGLOBIN  10.6*   < >  10.1*  11.5*  10.7*   HEMATOCRIT  31.8*   --    --    --    --    MCV  92.2   --    --    --    --    MCH  30.7   --    --    --    --    MCHC  33.3*   --    --    --    --    RDW  44.5   --    --    --    --    PLATELETCT  199   --    --    --    --    MPV  10.3   --    --    --    --     < > = values in this interval not displayed.     Recent Labs      08/19/18 2344 08/21/18 0225   SODIUM  140  142   POTASSIUM  3.7  3.7   CHLORIDE  108  111   CO2  24  26   GLUCOSE  107*  97   BUN  19  13   CREATININE  0.97  0.84   CALCIUM  9.1  8.6     Recent Labs      08/19/18 2344   INR  1.53*             Colonoscopy findings are consistent with sigmoid mass at proximal sigmoid colon at 40 cm circumferential malignant appearance and EGD findings are consistent with erosive esophagitis     Assessment/Plan     Erosive esophagitis   Assessment & Plan    Start patient on Protonix        Mass of colon   Assessment & Plan    Colon mass colonoscopy and EGD findings are reviewed.  Concerning for malignancy.  Obtain CT chest abdomen and pelvis with IV  contrast to evaluate metastases.  Patient will need outpatient surgery consult and outpatient oncology consult.  I left a message for Dr. Alex Martinez at [facility] who his primary care physician per patient.  I spoke with Dr. Martinez's assistant at his office and requested to call me back with the phone numbers.  I provided my phone number.  Plan is to discharge likely tomorrow.        Pulmonary embolism (HCC)   Assessment & Plan    Patient has a colonic mass which is likely causing thromboembolic state.  Patient will require anticoagulation.  I spoke with gastroenterology who recommends to continue anticoagulation unless there is evidence of more bleeding.  Patient is hemodynamically stable and normal drop in hemoglobin and hematocrit.           Quality-Core Measures   Reviewed items::  EKG reviewed  Talley catheter::  No Talley  DVT prophylaxis pharmacological::  Contraindicated - Anemia requiring blood transfusion  DVT prophylaxis - mechanical:  SCDs

## 2018-08-22 NOTE — PROCEDURES
DATE OF SERVICE:  08/22/2018    PROCEDURES:  1.  Esophagogastroduodenoscopy with biopsy.  2.  Colonoscopy with biopsy, submucosal injection of SPOT tattoo, hot snare   polypectomy.    INDICATION:  Anemia, acute GI blood loss while on anticoagulation therapy.    FINAL IMPRESSION:  EGD FINDINGS:  1.  Esophagus:  Proximal esophagus unremarkable.  2.  Suspected Salguero's esophagus, short segment, biopsied.  3.  [place] grade A erosive esophagitis.  4.  A 5 cm hiatal hernia.  5.  Antrum erosive gastritis, biopsied.  6.  Duodenum, unremarkable mucosa.    COLONOSCOPY FINDINGS:  1.  Digital rectal exam unremarkable.  2.  A 10 mm rectosigmoid colon polyp, pedunculated, removed with hot snare.  3.  Colon mass at proximal sigmoid colon approximately 40 cm from anus,   circumferential 5 mm lumen, unable to cross with scope.  4.  Malignant appearance.  Biopsied and tattooed with SPOT 2 cm distal from   lesion.  5.  Proximal colon, not visualized, inability to pass scope.    RECOMMENDATIONS:  1.  Follow up final pathology.  2.  Check CEA, this has been ordered.  3.  Check CT scan of the chest, abdomen and pelvis.  This has been ordered.  4.  Patient will need to establish with oncologist when she returns home to   [facility].  5.  Patient will need surgical consultation when she returns home unless she   wishes to have consult while here.  6.  Patient is high risk for anticoagulation therapy at this time; however, if   this is deemed absolutely medically necessary, okay to start.    This has been discussed with patient's hospitalist.    GI will sign off/standby, please call if any concerns arise.    DESCRIPTION OF PROCEDURE:  Prior to the procedure, physical exam was stable.    During procedure, vital signs remained within normal limits.  Prior to   sedation, informed consent was obtained.  Risks, benefits, alternatives   including but not limited to risk of bleeding, infection, perforation, adverse   reaction to  medication, failure to identify pathology and death explained to   the patient and her  who accepted all risks.  Patient was prepped in   the left lateral position.  IV sedation given slowly in an incremental fashion   throughout the procedures to achieve desired effect to a total of fentanyl 75   mcg, midazolam 3 mg.    EGD:  Scope tip of the Olympus flexible gastroscope passed in the proximal   esophagus, proximal middle and distal thirds of the esophagus were well   visualized, the proximal esophagus was unremarkable.  The Z line was located   at 35 cm, but there were 2 cm tongues of salmon-colored mucosa extending up to   33 cm.  There were no suspicious features on white light or narrow band   imaging.  Biopsies were taken.  At the GE junction, there were small erosions   consistent with [place] grade A erosive esophagitis.  Stomach was entered.    There was a hiatal hernia from 35-40 cm present.  Air was insufflated.    Gastric pool was suctioned.  Scope retroflexed to view the body, fundus, and   cardia, then withdrawn back into the hiatal hernia to view the GE junction on   the cardia aspect, all of which otherwise appeared unremarkable.  Scope   straightened to view the antrum and incisura.  In the antrum, there were   multiple small erosions seen with edema, this was biopsied.  The pylorus was   patent.  Duodenal bulb sweep second and third portion were well visualized and   were unremarkable.  The gastroscope was withdrawn and we proceeded with   colonoscopy.    Colonoscopy:  Digital rectal exam was performed and was unremarkable.  Scope   tip of the Olympus flexible adjustable adult colonoscope passed into the   rectum, air was insufflated, scope retroflexed to view the distal rectum and   was unremarkable.  Scope straightened and passed to the rectosigmoid junction   where a 10 mm pedunculated polyp was seen.  Scope further advanced into the   sigmoid colon where moderate diverticulosis was  experienced.  At 40 cm from   the anus, the proximal sigmoid colon/distal descending colon, a   circumferential nearly obstructive malignant-appearing lesion was seen.  This   was slightly friable and fungating.  The lumen was only 5 mm and the scope   could not be passed through the lesion.  The lesion through the lumen was seen   to extend at least 4 cm in length.  Extensive biopsies were taken around the   circumference of the lesion.  The scope was withdrawn 2 cm and a SPOT tattoo   was placed just 2 cm distal to the lesion with good mucosal lifting.  The   scope was then slowly withdrawn visualizing the sigmoid colon in a methodical   360-degree manner.  Once again, the diverticulosis was appreciated.  A 1 cm   pedunculated polyp at the rectosigmoid junction was once again identified and   was removed with hot snare polypectomy method.  Scope was withdrawn.  Air and   liquid were suctioned.  Patient tolerated the procedure well.  The procedure   was deemed complete and patient recovered in the room without immediate   complications.       ____________________________________     MD KACI ARCE / AMOS    DD:  08/22/2018 11:44:41  DT:  08/22/2018 12:11:56    D#:  [id number]  Job#:  [id number]

## 2018-08-22 NOTE — DISCHARGE PLANNING
Anticipated Discharge Disposition: Home    Action: LSW met with pt at bedside. LSW explained Medicare rights to pt and pt does not wish to discharge at this time.     Pt will need a follow-up appointment with her PCP- Dr. Alex Martinez at [facility] in [facility]. LSW informed scheduling.     Barriers to Discharge: None    Plan: Awaiting medical clearance.

## 2018-08-22 NOTE — CARE PLAN
Problem: Knowledge Deficit  Goal: Knowledge of disease process/condition, treatment plan, diagnostic tests, and medications will improve  Outcome: PROGRESSING AS EXPECTED  Instructed to notify RN upon clear output for EDG prep. Further re-enforcement required.   Goal: Knowledge of the prescribed therapeutic regimen will improve  Outcome: PROGRESSING AS EXPECTED  Explained all patient medications as well as possible side effects. No further questions at this time.

## 2018-08-22 NOTE — OR SURGEON
Immediate Post OP Note    PreOp Diagnosis: Acute GI bleed anemia    PostOp Diagnosis: Same    Procedure(s):  GASTROSCOPY-ENDO - Wound Class: Clean Contaminated  COLONOSCOPY - ENDO - Wound Class: Clean Contaminated    Surgeon(s):  James Luis M.D.    Anesthesiologist/Type of Anesthesia:  No anesthesia staff entered./Sedation    Surgical Staff:  Endoscopy Technician: Herberth Bañuelos  Sedation/Monitoring Nurse: Ashley Loya R.N.    Specimens removed if any:  * No specimens in log *    Dr. Luis  [facility]  [place] [facility]  [phone]    EGD with:  Biopsy    Colonoscopy with: Biopsy     Submucosal injection of SPOT Tattoo     Hot snare polypectomy    Indication:  Anemia - acute GI blood loss    Sedation:  75mcg Fentanyl, 3mg Midazolam    Findings:   EGD    Esophagus     Proximal esophagus unremarkable     Salguero's      Suspected      Tongues of salmon-colored mucosa      33-35cm      Biopsied     Erosive esophagitis - LA grade A    Stomach     Hiatal hernia - 35-40cm     Antrum erosions - biopsied    Duodenum     Unremarkable mucosa     Colonoscopy    IGOR     Unremarkable    Colon     Rectosigmoid polyp      10mm      Pedunculated      Hot snare polypectomy     Sigmoid diverticulosis - moderate     Mass      Proximal sigmoid colon at 40cm      Circumferential      5mm lumen - unable to cross with scope      Malignant appearance      Biopsied      SPOT tattoo placed 2 cm distal    Plan:   Follow up final pathology     Check CEA (ordered)     Check CT scan chest / abdomen / pelvis (ordered)     Will need to establish with oncologist when she returns home     Will need surgical consult when she returns home unless she wishes here     High risk for anticoagulation therapy at this time     **  GI WILL SIGN OFF / STAND BY - PLEASE CALL IF ANY CONCERNS  **      8/22/2018 10:58 AM James Luis M.D.

## 2018-08-22 NOTE — PROGRESS NOTES
Received patient report from off going RN. Patient A/O x4 currently in no acute distress; no complaints of CP, SOB, or AMS throughout the day. Patient is monitored on telemetry and is currently showing NSR at 69 beats per minute. Patient instructed to drink Golytely until output is clear; if she is confused RN will verify to make sure output is clear enough for scopy in the AM. Bed set and locked in lowest position with patient call light within reach. Hourly rounding occurring.

## 2018-08-23 VITALS
HEIGHT: 64 IN | SYSTOLIC BLOOD PRESSURE: 105 MMHG | BODY MASS INDEX: 31.31 KG/M2 | DIASTOLIC BLOOD PRESSURE: 57 MMHG | WEIGHT: 183.42 LBS | OXYGEN SATURATION: 90 % | RESPIRATION RATE: 18 BRPM | TEMPERATURE: 98.3 F | HEART RATE: 74 BPM

## 2018-08-23 PROCEDURE — A9270 NON-COVERED ITEM OR SERVICE: HCPCS | Performed by: FAMILY MEDICINE

## 2018-08-23 PROCEDURE — 99239 HOSP IP/OBS DSCHRG MGMT >30: CPT | Performed by: FAMILY MEDICINE

## 2018-08-23 PROCEDURE — 700102 HCHG RX REV CODE 250 W/ 637 OVERRIDE(OP): Performed by: FAMILY MEDICINE

## 2018-08-23 RX ADMIN — OMEPRAZOLE 20 MG: 20 CAPSULE, DELAYED RELEASE ORAL at 05:22

## 2018-08-23 ASSESSMENT — PAIN SCALES - GENERAL
PAINLEVEL_OUTOF10: 0

## 2018-08-23 NOTE — CARE PLAN
Problem: Infection  Goal: Will remain free from infection  Outcome: PROGRESSING AS EXPECTED  Pt educated on the importance of hand washing to reduce the risk of infection. Pt verbally understands and performs hand hygiene to reduce to risks of infection.     Problem: Venous Thromboembolism (VTW)/Deep Vein Thrombosis (DVT) Prevention:  Goal: Patient will participate in Venous Thrombosis (VTE)/Deep Vein Thrombosis (DVT)Prevention Measures  Outcome: PROGRESSING AS EXPECTED  Pt understands the use of the SCDs and frequent ambulation to prevent DVTs in the lower extremities.

## 2018-08-23 NOTE — PROGRESS NOTES
Received patient report from off going RN. Patient A/O x4 currently in no acute distress; no complaints of CP, SOB, or AMS throughout the day. Patient is currently monitored on telemetry and is showing NSR at 83 bpm. Bed set and locked in lowest position with patient call light in reach. Hourly rounding occurring.

## 2018-08-23 NOTE — PROGRESS NOTES
Bedside report received from NOC RN Bib, pt is resting in bed, awake and alert with no reports of pain or discomfort. Pt is anxious to be discharged, no issues overnight. Bed is locked in lowest position with call light, belongings within reach, white board updated, and POC discussed. All needs met at this time.

## 2018-08-23 NOTE — PROGRESS NOTES
I spoke with patient's primary care Dr. Mark Martinez and faxed all the record to Dr. Martinez's office.  Patient will have a follow-up on 28 August 2018 with Dr. Martinez.  At this time patient is considered stable for discharge.  All questions answered to satisfaction to patient at bedside.    Pending studies at the time of discharge    #1: Biopsy to be followed by Dr. Mark Martinez  #2  Oncology consult as an outpatient to be arranged by Dr. Martinez  #3 General surgery consult to be arranged by Dr. Martinez

## 2018-08-23 NOTE — DISCHARGE INSTRUCTIONS
Discharge Instructions    Discharged to home by car with relative. Discharged via wheelchair, hospital escort: Yes.  Special equipment needed: Not Applicable    Be sure to schedule a follow-up appointment with your primary care doctor or any specialists as instructed.     Discharge Plan:   Diet Plan: Discussed   Activity Level: Discussed  Confirmed Follow up Appointment: Appointment Scheduled  Confirmed Symptoms Management: Discussed   Medication Reconciliation Updated: Yes Pneumococcal Vaccine Administered/Refused: Not given - Patient refused pneumococcal vaccine (Patient recieved a PNA shot last year (August 2017) at her PCP. Patient doesn't recall which one it was. )  Influenza Vaccine Indication: Not indicated: Previously immunized this influenza season and > 8 years of age    I understand that a diet low in cholesterol, fat, and sodium is recommended for good health. Unless I have been given specific instructions below for another diet, I accept this instruction as my diet prescription.   Other diet: Regular    Special Instructions: None    · Is patient discharged on Warfarin / Coumadin?   No     Depression / Suicide Risk    As you are discharged from this [facility] facility, it is important to learn how to keep safe from harming yourself.    Recognize the warning signs:  · Abrupt changes in personality, positive or negative- including increase in energy   · Giving away possessions  · Change in eating patterns- significant weight changes-  positive or negative  · Change in sleeping patterns- unable to sleep or sleeping all the time   · Unwillingness or inability to communicate  · Depression  · Unusual sadness, discouragement and loneliness  · Talk of wanting to die  · Neglect of personal appearance   · Rebelliousness- reckless behavior  · Withdrawal from people/activities they love  · Confusion- inability to concentrate     If you or a loved one observes any of these behaviors or has concerns about  self-harm, here's what you can do:  · Talk about it- your feelings and reasons for harming yourself  · Remove any means that you might use to hurt yourself (examples: pills, rope, extension cords, firearm)  · Get professional help from the community (Mental Health, Substance Abuse, psychological counseling)  · Do not be alone:Call your Safe Contact- someone whom you trust who will be there for you.  · Call your local CRISIS HOTLINE 819-5636 or 814-696-3514  · Call your local Children's Mobile Crisis Response Team [place] (397) 493-1570 or www.[employer]  · Call the toll free National Suicide Prevention Hotlines   · National Suicide Prevention Lifeline 951-472-MWPB (2090)  · 5 CUPS and some sugar Line Network 800-SUICIDE (024-1475)        Gastrointestinal Bleeding  Gastrointestinal (GI) bleeding is bleeding somewhere along the digestive tract, between the mouth and anus. This can be caused by various problems. The severity of these problems can range from mild to serious or even life-threatening. If you have GI bleeding, you may find blood in your stools (feces), you may have black stools, or you may vomit blood. If there is a lot of bleeding, you may need to stay in the hospital.  What are the causes?  This condition may be caused by:  · Esophagitis. This is inflammation, irritation, or swelling of the esophagus.  · Hemorrhoids. These are swollen veins in the rectum.  · Anal fissures. These are areas of painful tearing that are often caused by passing hard stool.  · Diverticulosis. These are pouches that form on the colon over time, with age, and may bleed a lot.  · Diverticulitis. This is inflammation in areas with diverticulosis. It can cause pain, fever, and bloody stools, although bleeding may be mild.  · Polyps and cancer. Colon cancer often starts out as precancerous polyps.  · Gastritis and ulcers. With these, bleeding may come from the upper GI tract, near the stomach.  What are the signs or  symptoms?  Symptoms of this condition may include:  · Bright red blood in your vomit, or vomit that looks like coffee grounds.  · Bloody, black, or tarry stools.  ¨ Bleeding from the lower GI tract will usually cause red or maroon blood in the stools.  ¨ Bleeding from the upper GI tract may cause black, tarry, often bad-smelling stools.  ¨ In certain cases, if the bleeding is fast enough, the stools may be red.  · Pain or cramping in the abdomen.  How is this diagnosed?  This condition may be diagnosed based on:  · Medical history and physical exam.  · Various tests, such as:  ¨ Blood tests.  ¨ X-rays and other imaging tests.  ¨ Esophagogastroduodenoscopy (EGD). In this test, a flexible, lighted tube is used to look at your esophagus, stomach, and small intestine.  ¨ Colonoscopy. In this test, a flexible, lighted tube is used to look at your colon.  How is this treated?  Treatment for this condition depends on the cause of the bleeding. For example:  · For bleeding from the esophagus, stomach, small intestine, or colon, the health care provider doing your EGD or colonoscopy may be able to stop the bleeding as part of the procedure.  · Inflammation or infection of the colon can be treated with medicines.  · Certain rectal problems can be treated with creams, suppositories, or warm baths.  · Surgery is sometimes needed.  · Blood transfusions are sometimes needed if a lot of blood has been lost.  If bleeding is slow, you may be allowed to go home. If there is a lot of bleeding, you will need to stay in the hospital for observation.  Follow these instructions at home:  · Take over-the-counter and prescription medicines only as told by your health care provider.  · Eat foods that are high in fiber. This will help to keep your stools soft. These foods include whole grains, legumes, fruits, and vegetables. Eating 1-3 prunes each day works well for many people.  · Drink enough fluid to keep your urine clear or pale  yellow.  · Keep all follow-up visits as told by your health care provider. This is important.  Contact a health care provider if:  · Your symptoms do not improve.  Get help right away if:  · Your bleeding increases.  · You feel light-headed or you faint.  · You feel weak.  · You have severe cramps in your back or abdomen.  · You pass large blood clots in your stool.  · Your symptoms are getting worse.  This information is not intended to replace advice given to you by your health care provider. Make sure you discuss any questions you have with your health care provider.  Document Released: 12/15/2001 Document Revised: 05/17/2017 Document Reviewed: 06/06/2016  Elsevier Interactive Patient Education © 2017 [employer]

## 2018-08-23 NOTE — PROGRESS NOTES
Pt discharged home, leaving the unit independently with family. Pt verbally acknowledges all discharge instructions, medications, and medications regimen. Pt gathered all personal belongings, Tele box and IV have been removed. All questions and needs have been met at this time.

## 2018-08-23 NOTE — DISCHARGE SUMMARY
Discharge Summary    CHIEF COMPLAINT ON ADMISSION  Chief Complaint   Patient presents with   • Rectal Bleeding     Pt reports loose dark clots in stool that started at 1100, but states blood is now bright red.        Reason for Admission  EMS     Admission Date  8/19/2018    CODE STATUS  Full Code    HPI & HOSPITAL COURSE  This is a 68 y.o. Female who was diagnosed pulmonary embolism on July 30, 2018 and was kept on Xarelto presented to the hospital for evaluation of lower GI bleed.  Patient did not have any hemodynamic instability.  Patient has a stable hemoglobin of 10-11.  Patient had lower gastroesophageal bleeding with dark red blood.    Patient was consulted to gastroenterologist.  Patient underwent for colonoscopy which revealed erosive gastritis unfortunately patient is found to have a rectosigmoid mass of 10 cm pedunculated concerning for malignancy.  Snare biopsy were taken by gastroenterologist.  At the time of discharge biopsy results are pending.     Patient underwent for CT chest, abdomen, pelvis with IV contrast as a part of metastatic workup which did not reveal any metastatic disease.    I spoke with patient's primary care doctor Dr. Alex Martinez in [facility] and updated hospitalization.  Patient will need outpatient surgical consult, oncology consult and patient's primary care physician needs to obtain the results of the biopsy result from our health system which was very well explained and clarified with patient, patient's family members and patient's primary care physician.    Considering overall clinical stability patient is considered stable for discharge to home.  Anticoagulation with Xarelto can be resumed.  Patient will be given proton pump inhibitor for erosive gastritis.    CEA level is 1.6 and appears to be within normal limits    Therefore, she is discharged in good and stable condition to home with close outpatient follow-up.    The patient met 2-midnight criteria for an  inpatient stay at the time of discharge.    Discharge Date  8/23/2018 patient was prepared for the discharge on August 23, 2018.  Patient is discharged actually on August 23, 2018.    FOLLOW UP ITEMS POST DISCHARGE  1.  Biopsy result of the sigmoid mass  #2.  Oncology follow-up as an outpatient  #3 surgical consult as an outpatient    All above follow-up will be arranged by Dr. Martinez as per my conversation with Dr. Martinez    DISCHARGE DIAGNOSES  Principal Problem (Resolved):    GIB (gastrointestinal bleeding) POA: Unknown  Active Problems:    Pulmonary embolism (HCC) POA: Unknown    Mass of colon (Chronic) POA: Unknown    Erosive esophagitis (Chronic) POA: Unknown      FOLLOW UP  No future appointments.  Alex Martinez  [address] [place] [address] [address]  [phone]  [place]   Go on 8/31/2018  Please arrive 1:30 for your appoinment . Thank you       MEDICATIONS ON DISCHARGE     Medication List      START taking these medications      Instructions   rivaroxaban 20 MG Tabs tablet  Commonly known as:  XARELTO  Replaces:  XARELTO STARTER PACK 15 & 20 MG Tbpk   Take 1 Tab by mouth with dinner.  Dose:  20 mg        STOP taking these medications    XARELTO STARTER PACK 15 & 20 MG Tbpk  Generic drug:  Rivaroxaban  Replaced by:  rivaroxaban 20 MG Tabs tablet            Allergies  No Known Allergies    DIET  Orders Placed This Encounter   Procedures   • Diet Order Regular     Standing Status:   Standing     Number of Occurrences:   1     Order Specific Question:   Diet:     Answer:   Regular [1]       ACTIVITY  As tolerated.  Weight bearing as tolerated    CONSULTATIONS  Gastroenterology    PROCEDURES  Colonoscopy and EGD    LABORATORY  Lab Results   Component Value Date    SODIUM 142 08/21/2018    POTASSIUM 3.7 08/21/2018    CHLORIDE 111 08/21/2018    CO2 26 08/21/2018    GLUCOSE 97 08/21/2018    BUN 13 08/21/2018    CREATININE 0.84 08/21/2018        Lab Results   Component Value Date    WBC 7.1 08/19/2018    HEMOGLOBIN 10.7 (L)  08/21/2018    HEMATOCRIT 31.8 (L) 08/19/2018    PLATELETCT 199 08/19/2018      DATE OF SERVICE:  08/22/2018     PROCEDURES:  1.  Esophagogastroduodenoscopy with biopsy.  2.  Colonoscopy with biopsy, submucosal injection of SPOT tattoo, hot snare   polypectomy.     INDICATION:  Anemia, acute GI blood loss while on anticoagulation therapy.     FINAL IMPRESSION:  EGD FINDINGS:  1.  Esophagus:  Proximal esophagus unremarkable.  2.  Suspected Salguero's esophagus, short segment, biopsied.  3.  [place] grade A erosive esophagitis.  4.  A 5 cm hiatal hernia.  5.  Antrum erosive gastritis, biopsied.  6.  Duodenum, unremarkable mucosa.     COLONOSCOPY FINDINGS:  1.  Digital rectal exam unremarkable.  2.  A 10 mm rectosigmoid colon polyp, pedunculated, removed with hot snare.  3.  Colon mass at proximal sigmoid colon approximately 40 cm from anus,   circumferential 5 mm lumen, unable to cross with scope.  4.  Malignant appearance.  Biopsied and tattooed with SPOT 2 cm distal from   lesion.  5.  Proximal colon, not visualized, inability to pass scope.     RECOMMENDATIONS:  1.  Follow up final pathology.  2.  Check CEA, this has been ordered.  3.  Check CT scan of the chest, abdomen and pelvis.  This has been ordered.  4.  Patient will need to establish with oncologist when she returns home to   [facility].  5.  Patient will need surgical consultation when she returns home unless she   wishes to have consult while here.  6.  Patient is high risk for anticoagulation therapy at this time; however, if   this is deemed absolutely medically necessary, okay to start.     This has been discussed with patient's hospitalist.     GI will sign off/standby, please call if any concerns arise.           Total time of the discharge process exceeds 45 minutes.

## 2018-08-23 NOTE — CARE PLAN
Problem: Safety  Goal: Will remain free from falls  Outcome: PROGRESSING AS EXPECTED  Instructed patient to use the call light for any and all patient needs. Patient acknowledged.     Problem: Knowledge Deficit  Goal: Knowledge of disease process/condition, treatment plan, diagnostic tests, and medications will improve  Outcome: PROGRESSING AS EXPECTED  Explained patient POC as well as discharge instructions for the morning. Patient has no questions at this current time.

## 2018-08-24 NOTE — DOCUMENTATION QUERY
"DOCUMENTATION QUERY    PROVIDERS: Please select “Cosign w/ note”to reply to query.    To better represent the severity of illness of your patient, please review the following information and exercise your independent professional judgment in responding to this query.     \"Sigmoid colon mass: moderately differentiated adenocarcinoma\" is noted in the Pathology Result. Based upon the clinical findings, risk factors, and treatment, can a diagnosis be provided to support this finding?    • Agree with pathologyy finding of sigmoid colon mass: moderately differentiated carcinoma (please specify if present on admission)  • Disagree with pathology finding of sigmoid colon mass: moderately differentiated carcinoma  • Other explanation of clinical findings  • Findings of no clinical significance  • Unable to determine    The medical record reflects the following:   Clinical Findings 8/20 H&P: \"GI bleed\" and \"symptoms including dark stools, melena, and bright red blood\" is documented.  8/22 Procedure Note: \"Colon mass at proximal sigmoid colon approximately 40 cm from anus, circumferential 5 mm lumen, unable to cross with scope\" and \"malignant appearance.  Biopsied and tattooed with SPOT 2 cm distal from lesion\" is documented.   Treatment GI consult; EGD; colonoscopy; biopsy sigmoid colon mass; lab testing; radiology testing   Risk Factors Age; alcohol use   Location within medical record History and Physical, Progress Notes, Discharge Summary, Pathology Report and Op Report     Thank you,   Madeleine Hubbard RN  Clinical   [phone]          "

## 2018-08-28 NOTE — DOCUMENTATION QUERY
"DOCUMENTATION QUERY    PROVIDERS: Please select “Cosign w/ note”to reply to query.    To better represent the severity of illness of your patient, please review the following information and exercise your independent professional judgment in responding to this query.     Patient on anticoagulation for recent history of pulmonary embolism and DVT was admitted with GI bleed. Post-operative EGD and Colonoscopy findings on 8/22 are documented as \"Suspected Salguero's esophagus, [place] grade A esophagitis, antrum erosive gastritis and sigmoid colon mass.\"    Pathology report documents  \"Chronic gastritis, Salguero's esophagus, esophagitis, recto colon polyp,and adenocarcinoma of sigmoid colon mass.\"   Discharge Summary documents erosive gastritis in HPI and erosive esophagitis (chronic) without clear etiology of bleed.      Based upon the clinical findings, risk factors, and treatment, can the cause of GI bleed be further specified?      Please select ALL that apply:    • Salguero's esophagus  • Erosive Esophagitis   • Erosive Gastritis   • Recto colon polyp  • Sigmoid colon adenocarcinoma  • Adverse effect of anticoagulation     • Other explanation of clinical findings (please document)  • Unable to determine        The medical record reflects the following:   Clinical Findings  EGD/Colonoscopy Report 8/22/18, James Luis MD    INDICATION:  Anemia, acute GI blood loss while on anticoagulation therapy.     FINAL IMPRESSION:  EGD FINDINGS:  1.  Esophagus:  Proximal esophagus unremarkable.  2.  Suspected Salguero's esophagus, short segment, biopsied.  3.  [place] grade A erosive esophagitis.  4.  A 5 cm hiatal hernia.  5.  Antrum erosive gastritis, biopsied.  6.  Duodenum, unremarkable mucosa.      Pathology Report    FINAL DIAGNOSIS:    A. Antrum biopsy:         Mild nonspecific chronic gastritis.         Warthin-Starry stain performed with adequate control is negative          for Helicobacter pylori-like " organisms.         Negative for atypia and malignancy.  B. Esophagus 33-35 cm:         A few minute fragments of glandular epithelium focally showing          goblet cells consistent with intestinal metaplasia/Salguero's          epithelium.         Fragments of benign esophageal squamous mucosa with mild acute          and chronic inflammation and reactive changes.         Negative for dysplasia and malignancy.  C. Sigmoid colon mass:         Moderately differentiated adenocarcinoma with at least lamina          propria invasion.  D. Rectosigmoid polyp:         Tubular adenoma without high-grade dysplasia.    Comment: Specimen C is reviewed with Dr. Jiang and he concurs with  the diagnosis.                                      Diagnosis performed by:                                      PRATIBHA AZAR MD     Treatment  EGD/Colonoscopy, biopsies, polypectomy   Risk Factors GI bleed  Acute blood loss anemia   On anticoagulation for recent history of PE and DVT   Suspected Salguero's esophagus   Antrum erosive gastritis   LA grade A erosive esophagitis   Sigmoid colon adenocarcinoma per query response     Location within medical record  EGD/Colonoscopy report, Pathology report, Discharge Summary       Thank you,   [email or web address]

## 2018-08-29 ENCOUNTER — DOCUMENTATION (OUTPATIENT)
Dept: OTHER | Facility: MEDICAL CENTER | Age: 68
End: 2018-08-29

## 2018-08-29 NOTE — PROGRESS NOTES
On 8/29/2018 this ONN reviewed chart. Patient following-up in [place], [place] for treatment. Not appropriate for navigation.

## (undated) DEVICE — CATHERTER CLEAR SINGLE USE INJECTION THERAPY NEEDLE 25GA X 4MM  2.3MM X 240CM (5EA/BX)

## (undated) DEVICE — CON SEDATION/>5 YR 1ST 15 MIN

## (undated) DEVICE — KIT PROCEDURE DOUBLE ENDO ONLY (5/CA)

## (undated) DEVICE — SODIUM CHL. INJ. 0.9% 500ML (24EA/CA 50CA/PF)

## (undated) DEVICE — TRAP POLYP E-TRAP (25EA/BX)

## (undated) DEVICE — FILM CASSETTE ENDO

## (undated) DEVICE — MASK WITH FACE SHIELD (25/BX 4BX/CA)

## (undated) DEVICE — BASIN EMESIS DISP. - (250/CA)

## (undated) DEVICE — SYRINGE 3 CC 22 GA X 1-1/2 - NDL SAFETY (50/BX 8BX/CA)

## (undated) DEVICE — SYRINGE 6 CC 20 GA X 1 1/2 - NDL SAFETY  (50/BX)

## (undated) DEVICE — TUBING CLEARLINK DUO-VENT - C-FLO (48EA/CA)

## (undated) DEVICE — CANISTER SUCTION RIGID RED 1500CC (40EA/CA)

## (undated) DEVICE — CONTAINER, SPECIMEN, STERILE

## (undated) DEVICE — TATTOO ENDOSCOPIC SPOT EX (10EA/BX)

## (undated) DEVICE — ELECTRODE 850 FOAM ADHESIVE - HYDROGEL RADIOTRNSPRNT (50/PK)

## (undated) DEVICE — CON SEDATION EA ADDL 15 MIN

## (undated) DEVICE — BITE BLOCK ADULT 60FR (100EA/CA)

## (undated) DEVICE — SOD. CHL 10CC SYRINGE PREFILL - W/10 CC (30/BX)

## (undated) DEVICE — CANNULA W/ SUPPLY TUBING O2 - (50/CA)